# Patient Record
Sex: FEMALE | Race: BLACK OR AFRICAN AMERICAN | Employment: FULL TIME | URBAN - METROPOLITAN AREA
[De-identification: names, ages, dates, MRNs, and addresses within clinical notes are randomized per-mention and may not be internally consistent; named-entity substitution may affect disease eponyms.]

---

## 2018-08-22 LAB
CHLAMYDIA, EXTERNAL: NEGATIVE
HBSAG, EXTERNAL: NEGATIVE
HIV, EXTERNAL: NEGATIVE
N. GONORRHEA, EXTERNAL: NEGATIVE
RPR, EXTERNAL: NON REACTIVE
RUBELLA, EXTERNAL: NORMAL
TYPE, ABO & RH, EXTERNAL: NORMAL

## 2019-01-11 ENCOUNTER — HOSPITAL ENCOUNTER (OUTPATIENT)
Age: 34
Setting detail: OBSERVATION
Discharge: OTHER INSTITUTION | DRG: 833 | End: 2019-01-12
Attending: OBSTETRICS & GYNECOLOGY | Admitting: OBSTETRICS & GYNECOLOGY
Payer: COMMERCIAL

## 2019-01-11 LAB
ABO + RH BLD: NORMAL
ALBUMIN SERPL-MCNC: 2.5 G/DL (ref 3.4–5)
ALBUMIN/GLOB SERPL: 0.7 {RATIO} (ref 0.8–1.7)
ALP SERPL-CCNC: 68 U/L (ref 45–117)
ALT SERPL-CCNC: 24 U/L (ref 13–56)
ANION GAP SERPL CALC-SCNC: 6 MMOL/L (ref 3–18)
AST SERPL-CCNC: 27 U/L (ref 15–37)
BILIRUB SERPL-MCNC: 0.1 MG/DL (ref 0.2–1)
BLOOD GROUP ANTIBODIES SERPL: NORMAL
BUN SERPL-MCNC: 10 MG/DL (ref 7–18)
BUN/CREAT SERPL: 19 (ref 12–20)
CALCIUM SERPL-MCNC: 8.3 MG/DL (ref 8.5–10.1)
CHLORIDE SERPL-SCNC: 109 MMOL/L (ref 100–108)
CO2 SERPL-SCNC: 27 MMOL/L (ref 21–32)
CREAT SERPL-MCNC: 0.52 MG/DL (ref 0.6–1.3)
ERYTHROCYTE [DISTWIDTH] IN BLOOD BY AUTOMATED COUNT: 12.2 % (ref 11.6–14.5)
GLOBULIN SER CALC-MCNC: 3.6 G/DL (ref 2–4)
GLUCOSE SERPL-MCNC: 69 MG/DL (ref 74–99)
HCT VFR BLD AUTO: 31.9 % (ref 35–45)
HGB BLD-MCNC: 10.9 G/DL (ref 12–16)
LDH SERPL L TO P-CCNC: 160 U/L (ref 81–234)
MCH RBC QN AUTO: 30.3 PG (ref 24–34)
MCHC RBC AUTO-ENTMCNC: 34.2 G/DL (ref 31–37)
MCV RBC AUTO: 88.6 FL (ref 74–97)
PLATELET # BLD AUTO: 198 K/UL (ref 135–420)
PMV BLD AUTO: 8.8 FL (ref 9.2–11.8)
POTASSIUM SERPL-SCNC: 4.1 MMOL/L (ref 3.5–5.5)
PROT SERPL-MCNC: 6.1 G/DL (ref 6.4–8.2)
RBC # BLD AUTO: 3.6 M/UL (ref 4.2–5.3)
SODIUM SERPL-SCNC: 142 MMOL/L (ref 136–145)
SPECIMEN EXP DATE BLD: NORMAL
URATE SERPL-MCNC: 3.3 MG/DL (ref 2.6–7.2)
WBC # BLD AUTO: 5.3 K/UL (ref 4.6–13.2)

## 2019-01-11 PROCEDURE — 85027 COMPLETE CBC AUTOMATED: CPT

## 2019-01-11 PROCEDURE — 74011250637 HC RX REV CODE- 250/637: Performed by: OBSTETRICS & GYNECOLOGY

## 2019-01-11 PROCEDURE — 80053 COMPREHEN METABOLIC PANEL: CPT

## 2019-01-11 PROCEDURE — 86900 BLOOD TYPING SEROLOGIC ABO: CPT

## 2019-01-11 PROCEDURE — 36415 COLL VENOUS BLD VENIPUNCTURE: CPT

## 2019-01-11 PROCEDURE — 74011250636 HC RX REV CODE- 250/636: Performed by: OBSTETRICS & GYNECOLOGY

## 2019-01-11 PROCEDURE — 59025 FETAL NON-STRESS TEST: CPT

## 2019-01-11 PROCEDURE — 83615 LACTATE (LD) (LDH) ENZYME: CPT

## 2019-01-11 PROCEDURE — 84550 ASSAY OF BLOOD/URIC ACID: CPT

## 2019-01-11 RX ORDER — ASPIRIN 81 MG/1
81 TABLET ORAL DAILY
COMMUNITY

## 2019-01-11 RX ORDER — DEXTROSE MONOHYDRATE AND SODIUM CHLORIDE 5; .9 G/100ML; G/100ML
1000 INJECTION, SOLUTION INTRAVENOUS ONCE
Status: DISCONTINUED | OUTPATIENT
Start: 2019-01-11 | End: 2019-01-11

## 2019-01-11 RX ORDER — BETAMETHASONE SODIUM PHOSPHATE AND BETAMETHASONE ACETATE 3; 3 MG/ML; MG/ML
12 INJECTION, SUSPENSION INTRA-ARTICULAR; INTRALESIONAL; INTRAMUSCULAR; SOFT TISSUE EVERY 24 HOURS
Status: DISCONTINUED | OUTPATIENT
Start: 2019-01-11 | End: 2019-01-12 | Stop reason: HOSPADM

## 2019-01-11 RX ORDER — FOLIC ACID 1 MG/1
1 TABLET ORAL DAILY
COMMUNITY

## 2019-01-11 RX ORDER — LABETALOL 200 MG/1
200 TABLET, FILM COATED ORAL EVERY 12 HOURS
Status: DISCONTINUED | OUTPATIENT
Start: 2019-01-11 | End: 2019-01-12 | Stop reason: HOSPADM

## 2019-01-11 RX ORDER — LABETALOL 200 MG/1
200 TABLET, FILM COATED ORAL 2 TIMES DAILY
COMMUNITY

## 2019-01-11 RX ORDER — DEXTROSE, SODIUM CHLORIDE, SODIUM LACTATE, POTASSIUM CHLORIDE, AND CALCIUM CHLORIDE 5; .6; .31; .03; .02 G/100ML; G/100ML; G/100ML; G/100ML; G/100ML
1000 INJECTION, SOLUTION INTRAVENOUS CONTINUOUS
Status: DISCONTINUED | OUTPATIENT
Start: 2019-01-11 | End: 2019-01-12

## 2019-01-11 RX ORDER — NIFEDIPINE 30 MG/1
30 TABLET, EXTENDED RELEASE ORAL DAILY
COMMUNITY

## 2019-01-11 RX ADMIN — BETAMETHASONE ACETATE AND BETAMETHASONE SODIUM PHOSPHATE 12 MG: 3; 3 INJECTION, SUSPENSION INTRA-ARTICULAR; INTRALESIONAL; INTRAMUSCULAR; SOFT TISSUE at 23:08

## 2019-01-11 RX ADMIN — SODIUM CHLORIDE, SODIUM LACTATE, POTASSIUM CHLORIDE, CALCIUM CHLORIDE, AND DEXTROSE MONOHYDRATE 1000 ML/HR: 600; 310; 30; 20; 5 INJECTION, SOLUTION INTRAVENOUS at 17:57

## 2019-01-11 RX ADMIN — LABETALOL HCL 200 MG: 200 TABLET, FILM COATED ORAL at 21:31

## 2019-01-11 NOTE — PROGRESS NOTES
1225 Patient presents to unit from office for NST due to being unable to obtain a reactive tracing in the office. Patient is having NSTs due to 2 previous pregnancies with PIH/pre eclampsia. Patient taken to [address] [place] [address]. FHM and TOCO applied to patient abdomen. 1044 [address] Dr Janneth Hernandez on phone for update on patient. SBAR report given. Order received. Mets[place] 36 Dr Janneth Hernandez on phone for update on patient. MD made aware of decels in fetal heart rate. MD does not desire steroids at this point, but would like to observe patient overnight. 1930 Bedside and verbal SBAR report given to KIARA Mcguire RN. Care of patient relinquished at this time.

## 2019-01-12 VITALS
HEIGHT: 64 IN | WEIGHT: 165.4 LBS | BODY MASS INDEX: 28.24 KG/M2 | HEART RATE: 88 BPM | RESPIRATION RATE: 16 BRPM | TEMPERATURE: 98 F | SYSTOLIC BLOOD PRESSURE: 129 MMHG | OXYGEN SATURATION: 98 % | DIASTOLIC BLOOD PRESSURE: 91 MMHG

## 2019-01-12 PROBLEM — O16.9 HIGH BLOOD PRESSURE AFFECTING PREGNANCY, ANTEPARTUM: Status: ACTIVE | Noted: 2019-01-12

## 2019-01-12 PROBLEM — O28.8 NST (NON-STRESS TEST) NONREACTIVE: Status: ACTIVE | Noted: 2019-01-12

## 2019-01-12 PROBLEM — O09.299 HX OF PREECLAMPSIA, PRIOR PREGNANCY, CURRENTLY PREGNANT: Status: ACTIVE | Noted: 2019-01-12

## 2019-01-12 PROBLEM — Z33.1 IUP (INTRAUTERINE PREGNANCY), INCIDENTAL: Status: ACTIVE | Noted: 2019-01-12

## 2019-01-12 PROCEDURE — 74011250636 HC RX REV CODE- 250/636: Performed by: OBSTETRICS & GYNECOLOGY

## 2019-01-12 PROCEDURE — 65270000029 HC RM PRIVATE

## 2019-01-12 PROCEDURE — 77030010547 HC BG URIN W/UMETER -A

## 2019-01-12 PROCEDURE — 99218 HC RM OBSERVATION: CPT

## 2019-01-12 PROCEDURE — 77030034849

## 2019-01-12 PROCEDURE — 77030032490 HC SLV COMPR SCD KNE COVD -B

## 2019-01-12 RX ORDER — LEVETIRACETAM 5 MG/ML
500 INJECTION INTRAVASCULAR
Status: DISCONTINUED | OUTPATIENT
Start: 2019-01-12 | End: 2019-01-12 | Stop reason: HOSPADM

## 2019-01-12 RX ORDER — MAGNESIUM SULFATE HEPTAHYDRATE 40 MG/ML
4 INJECTION, SOLUTION INTRAVENOUS
Status: COMPLETED | OUTPATIENT
Start: 2019-01-12 | End: 2019-01-12

## 2019-01-12 RX ORDER — MAGNESIUM SULFATE HEPTAHYDRATE 40 MG/ML
2 INJECTION, SOLUTION INTRAVENOUS CONTINUOUS
Status: DISCONTINUED | OUTPATIENT
Start: 2019-01-12 | End: 2019-01-12 | Stop reason: HOSPADM

## 2019-01-12 RX ORDER — MAGNESIUM SULFATE HEPTAHYDRATE 40 MG/ML
4 INJECTION, SOLUTION INTRAVENOUS
Status: DISCONTINUED | OUTPATIENT
Start: 2019-01-12 | End: 2019-01-12

## 2019-01-12 RX ORDER — MAGNESIUM SULFATE HEPTAHYDRATE 40 MG/ML
2 INJECTION, SOLUTION INTRAVENOUS ONCE
Status: DISCONTINUED | OUTPATIENT
Start: 2019-01-12 | End: 2019-01-12 | Stop reason: SDUPTHER

## 2019-01-12 RX ORDER — DIAZEPAM 10 MG/2ML
5 INJECTION INTRAMUSCULAR
Status: DISCONTINUED | OUTPATIENT
Start: 2019-01-12 | End: 2019-01-12 | Stop reason: HOSPADM

## 2019-01-12 RX ORDER — MAGNESIUM SULFATE HEPTAHYDRATE 40 MG/ML
2 INJECTION, SOLUTION INTRAVENOUS
Status: DISCONTINUED | OUTPATIENT
Start: 2019-01-12 | End: 2019-01-12

## 2019-01-12 RX ORDER — LORAZEPAM 2 MG/ML
2 INJECTION INTRAMUSCULAR
Status: DISCONTINUED | OUTPATIENT
Start: 2019-01-12 | End: 2019-01-12 | Stop reason: HOSPADM

## 2019-01-12 RX ORDER — CALCIUM GLUCONATE 94 MG/ML
1 INJECTION, SOLUTION INTRAVENOUS AS NEEDED
Status: DISCONTINUED | OUTPATIENT
Start: 2019-01-12 | End: 2019-01-12 | Stop reason: HOSPADM

## 2019-01-12 RX ADMIN — MAGNESIUM SULFATE IN WATER 2 G/HR: 40 INJECTION, SOLUTION INTRAVENOUS at 01:41

## 2019-01-12 RX ADMIN — MAGNESIUM SULFATE HEPTAHYDRATE 4 G: 40 INJECTION, SOLUTION INTRAVENOUS at 01:22

## 2019-01-12 NOTE — H&P
Ostetrical History and Physical    Subjective:     Date of Admission: 2019    Patient is a 28 y.o.  female admitted with h/o toxemia pretermx2 leading to c/s. Started with increased bp in office 26 wks and with h/o chronic hypertension was started on labetolol . Pt seen in office today with nst with no accels and questions of a decel to 90bpm in office. Sent to l/d for evaluation and continued monitoring. For Obstetric history, see britany.    Past Medical History:   Diagnosis Date    Anemia     Anemia NEC     with pregnancy    Chlamydia     Years ago; no recent    Essential hypertension     pregnancy only    Gestational hypertension       Past Surgical History:   Procedure Laterality Date     DELIVERY ONLY        Prior to Admission medications    Medication Sig Start Date End Date Taking? Authorizing Provider   labetalol (NORMODYNE) 200 mg tablet Take 200 mg by mouth two (2) times a day. Yes Provider, Historical   NIFEdipine ER (PROCARDIA XL) 30 mg ER tablet Take 30 mg by mouth daily. Yes Provider, Historical   aspirin delayed-release 81 mg tablet Take 81 mg by mouth daily. Yes Provider, Historical   folic acid (FOLVITE) 1 mg tablet Take 1 mg by mouth daily. Yes Provider, Historical   multivitamin (ONE A DAY) tablet Take 1 Tab by mouth daily. Yes Provider, Historical   cholecalciferol, vitamin D3, (VITAMIN D3) 2,000 unit Tab Take  by mouth. Yes Provider, Historical   ferrous sulfate (IRON) 325 mg (65 mg iron) tablet Take  by mouth Daily (before breakfast). Yes Provider, Historical   oxyCODONE-acetaminophen (PERCOCET) 5-325 mg per tablet Take 1-2 Tabs by mouth every four (4) hours as needed. 14   Bryan Frederick MD   acetaminophen (TYLENOL EXTRA STRENGTH) 500 mg tablet Take 1,000 mg by mouth every six (6) hours as needed.     Indications: PAIN    Provider, Historical     Allergies   Allergen Reactions    Fruit & Vegetable Daily [Lycopene-Lutein-Fruit Extract] Hives and Itching     Fresh fruit only      Social History     Tobacco Use    Smoking status: Never Smoker    Smokeless tobacco: Never Used   Substance Use Topics    Alcohol use: No      Family History   Problem Relation Age of Onset    Hypertension Mother     Alcohol abuse Father     Hypertension Father     Arthritis-osteo Maternal Aunt     Elevated Lipids Maternal Aunt     Hypertension Maternal Aunt     Hypertension Maternal Uncle     Arthritis-osteo Maternal Grandmother     Hypertension Maternal Grandmother     Stroke Maternal Grandmother         Review of Systems    Objective:     Blood pressure 130/86, pulse 82, temperature 98.1 °F (36.7 °C), resp. rate 14, height 5' 4\" (1.626 m), weight 75 kg (165 lb 6.4 oz), SpO2 98 %, unknown if currently breastfeeding. Temp (24hrs), Av.1 °F (36.7 °C), Min:98.1 °F (36.7 °C), Max:98.1 °F (36.7 °C)        No intake/output data recorded. No intake/output data recorded.     @[email or web address]    Pelvic: nd  Data Review:   Recent Results (from the past 24 hour(s))   CBC W/O DIFF    Collection Time: 19  7:40 PM   Result Value Ref Range    WBC 5.3 4.6 - 13.2 K/uL    RBC 3.60 (L) 4.20 - 5.30 M/uL    HGB 10.9 (L) 12.0 - 16.0 g/dL    HCT 31.9 (L) 35.0 - 45.0 %    MCV 88.6 74.0 - 97.0 FL    MCH 30.3 24.0 - 34.0 PG    MCHC 34.2 31.0 - 37.0 g/dL    RDW 12.2 11.6 - 14.5 %    PLATELET 077 569 - 319 K/uL    MPV 8.8 (L) 9.2 - 03.8 FL   METABOLIC PANEL, COMPREHENSIVE    Collection Time: 19  7:40 PM   Result Value Ref Range    Sodium 142 136 - 145 mmol/L    Potassium 4.1 3.5 - 5.5 mmol/L    Chloride 109 (H) 100 - 108 mmol/L    CO2 27 21 - 32 mmol/L    Anion gap 6 3.0 - 18 mmol/L    Glucose 69 (L) 74 - 99 mg/dL    BUN 10 7.0 - 18 MG/DL    Creatinine 0.52 (L) 0.6 - 1.3 MG/DL    BUN/Creatinine ratio 19 12 - 20      GFR est AA >60 >60 ml/min/1.73m2    GFR est non-AA >60 >60 ml/min/1.73m2    Calcium 8.3 (L) 8.5 - 10.1 MG/DL    Bilirubin, total 0.1 (L) 0.2 - 1.0 MG/DL    ALT (SGPT) 24 13 - 56 U/L    AST (SGOT) 27 15 - 37 U/L    Alk. phosphatase 68 45 - 117 U/L    Protein, total 6.1 (L) 6.4 - 8.2 g/dL    Albumin 2.5 (L) 3.4 - 5.0 g/dL    Globulin 3.6 2.0 - 4.0 g/dL    A-G Ratio 0.7 (L) 0.8 - 1.7     LD    Collection Time: 01/11/19  7:40 PM   Result Value Ref Range     81 - 234 U/L   URIC ACID    Collection Time: 01/11/19  7:40 PM   Result Value Ref Range    Uric acid 3.3 2.6 - 7.2 MG/DL   TYPE & SCREEN    Collection Time: 01/11/19  7:40 PM   Result Value Ref Range    Crossmatch Expiration 01/14/2019     ABO/Rh(D) Rae Scarlet POSITIVE     Antibody screen NEG      Monitor:  Reactivity:not present Variability:present Baseline:normal with occasional spontaneous deceleration to 110-90 bpm for 30 sec. Fluid was normal today and baby is active. efw 3% 3 days ago with abnormal dopplers umb seen    Assessment:r/o severe toxemia and will give steroids     Active Problems:    * No active hospital problems.  *      Plan:magnesium sulfate for nueroprotection and consider delivery after steroids     Prophylaxis:  Deep Vein Thrombosis Protocol Active:Yes    Check labs:    Check  Prenatal:    Disposition    Total time spent with patient:23 Hour    Signed By: Gail Bernardo MD                         January 12, 2019

## 2019-01-12 NOTE — ROUTINE PROCESS
1930:  Bedside and Verbal shift change report given to Yue Cid RN   (oncoming nurse) by Pamela Wahl RN (offgoing nurse). Report included the following information SBAR, MAR and Recent Results. Alarm parameters reviewed and opportunities for questions provided. 2131:  Labetalol given (see MAR). 2230:  Telephone report given to Dr. Jeb Johnson of decelerations noted. Order received to administer celestone. 2308:  Celestone given (see MAR). 0000:  Dr. Jeb Johnson at bedside assessing patient and discussing plan of care. Dr. Jeb Johnson reviewed fetal heart rate tracing while patient on unit and reviewed patient's prenatal history, blood pressures while on unit, lab results. 36:  Order received to start Magnesium Sulfate and Dr. Jeb Johnson spoke to receiving physician at [address] [place] [address] and delivery and order received to transfer patient at that time. , receiving RN at [facility], transport service. Transport service ETA [id number].     0974:  Transport service on unit. 0211:  Patient left via stretcher with transport company to [facility].

## 2019-01-12 NOTE — PROGRESS NOTES
Discussed with vicki at [facility]. Agreed with magnesium sulfate for nueroprotection and steroids.  After discussion decision was made to transfer pt to high risk center [facility] for observation with possible severe toxemia with iugd and abnormal umb dopplers now with abnormal fetal heart tracing

## 2019-01-12 NOTE — PROGRESS NOTES
Discussed with pt iugd with abnormal dopplers and with elevated bp requiring medication this may be severe toxemia. Will start mag sulfate for nueroprotection and finish steroids.  Will consult with [facility]fm ,as well

## 2019-01-14 PROBLEM — Z3A.30 30 WEEKS GESTATION OF PREGNANCY: Status: ACTIVE | Noted: 2019-01-14

## 2019-01-14 NOTE — DISCHARGE SUMMARY
Discharge Summary    Admit date: 2019  Discharge date:  2019    Name: Geraldo Rosario   Medical Record Number: [id number]   YOB: 1985    Admission Diagnosis:   Patient Active Problem List    Diagnosis Date Noted    30 weeks gestation of pregnancy 2019     Priority: 1 - One    Hx of preeclampsia, prior pregnancy, currently pregnant 2019     Priority: 1 - One    NST (non-stress test) nonreactive 2019     Priority: 1 - One    IUP (intrauterine pregnancy), incidental 2019     Priority: 1 - One    High blood pressure affecting pregnancy, antepartum 2019     Priority: 1 - One    Gestational hypertension w/o significant proteinuria in 3rd trimester 2014     Priority: 1 - One    Toxemia in pregnancy 2012     Priority: 1 - One    Essential hypertension antepartum 2012     Priority: 1 - One    Pregnancy induced hypertension, antepartum 2012     Priority: 1 - One    Recurrent pregnancy loss without current pregnancy 2012     Priority: 1 - One    History of HELLP syndrome, currently pregnant 2014    S/P repeat low transverse  2014       Final Diagnosis:  Principal Problem:    Gestational hypertension w/o significant proteinuria in 3rd trimester (2014)    Active Problems:    Hx of preeclampsia, prior pregnancy, currently pregnant (2019)      NST (non-stress test) nonreactive (2019)      IUP (intrauterine pregnancy), incidental (2019)      High blood pressure affecting pregnancy, antepartum (2019)      30 weeks gestation of pregnancy (2019)        Significant admission findings ( see H&P): Patient is a 28 y.o.  female admitted with h/o toxemia pretermx2 leading to c/s. Started with increased bp in office 26 wks and with h/o chronic hypertension was started on labetolol .  Pt seen in office today with nst with no accels and questions of a decel to 90bpm in office. Sent to l/d for evaluation and continued monitoring. Hospital course: Patient was admitted, steroids started, monitoring, Mg SO4 started. Consult with [facility]. Transferred to [facility] for [facility] care and US eval.      Relevant Lab:     ABO/Rh(D)   Date Value Ref Range Status   01/11/2019 O POSITIVE  Final     ABO,Rh   Date Value Ref Range Status   08/22/2018 O Positive  Final     WBC   Date Value Ref Range Status   01/11/2019 5.3 4.6 - 13.2 K/uL Final   08/02/2014 4.7 4.6 - 13.2 K/uL Final     HGB   Date Value Ref Range Status   01/11/2019 10.9 (L) 12.0 - 16.0 g/dL Final   08/03/2014 10.8 (L) 12.0 - 16.0 g/dL Final     HCT   Date Value Ref Range Status   01/11/2019 31.9 (L) 35.0 - 45.0 % Final   08/03/2014 30.8 (L) 35.0 - 45.0 % Final     PLATELET   Date Value Ref Range Status   01/11/2019 198 135 - 420 K/uL Final   08/02/2014 181 135 - 420 K/uL Final     Hgb, External   Date Value Ref Range Status   06/03/2014 10.7  Final     Hct, External   Date Value Ref Range Status   06/03/2014 32.9  Final       Lab Results   Component Value Date/Time    Rubella, External Immune 5.62 08/22/2018    GrBStrep, External Postive  07/29/2014     Rhogam not given. Medication List      ASK your doctor about these medications    aspirin delayed-release 81 mg tablet     folic acid 1 mg tablet  Commonly known as:  FOLVITE     Iron 325 mg (65 mg iron) tablet  Generic drug:  ferrous sulfate     labetalol 200 mg tablet  Commonly known as:  NORMODYNE     multivitamin tablet  Commonly known as:  ONE A DAY     NIFEdipine ER 30 mg ER tablet  Commonly known as:  PROCARDIA XL     oxyCODONE-acetaminophen 5-325 mg per tablet  Commonly known as:  PERCOCET  Take 1-2 Tabs by mouth every four (4) hours as needed.      TYLENOL EXTRA STRENGTH 500 mg tablet  Generic drug:  acetaminophen     VITAMIN D3 2,000 unit Tab  Generic drug:  cholecalciferol (vitamin D3)             Signed: Candace Worthy MD      January 14, 2019

## 2022-03-18 PROBLEM — O28.8 NST (NON-STRESS TEST) NONREACTIVE: Status: ACTIVE | Noted: 2019-01-12

## 2022-03-18 PROBLEM — Z33.1 IUP (INTRAUTERINE PREGNANCY), INCIDENTAL: Status: ACTIVE | Noted: 2019-01-12

## 2022-03-19 PROBLEM — O16.9 HIGH BLOOD PRESSURE AFFECTING PREGNANCY, ANTEPARTUM: Status: ACTIVE | Noted: 2019-01-12

## 2022-03-19 PROBLEM — O09.299 HX OF PREECLAMPSIA, PRIOR PREGNANCY, CURRENTLY PREGNANT: Status: ACTIVE | Noted: 2019-01-12

## 2022-03-19 PROBLEM — Z3A.30 30 WEEKS GESTATION OF PREGNANCY: Status: ACTIVE | Noted: 2019-01-14

## 2023-03-30 ENCOUNTER — HOSPITAL ENCOUNTER (OUTPATIENT)
Facility: HOSPITAL | Age: 38
Discharge: HOME | End: 2023-03-30
Payer: COMMERCIAL

## 2023-03-30 LAB
ALBUMIN SERPL-MCNC: 4.1 G/DL (ref 3.4–5)
ALBUMIN/GLOB SERPL: 1 (ref 0.8–1.7)
ALP SERPL-CCNC: 38 U/L (ref 45–117)
ALT SERPL-CCNC: 31 U/L (ref 13–56)
ANION GAP SERPL CALC-SCNC: 2 MMOL/L (ref 3–18)
AST SERPL-CCNC: 20 U/L (ref 10–38)
BASOPHILS # BLD: 0 K/UL (ref 0–0.1)
BASOPHILS NFR BLD: 1 % (ref 0–2)
BILIRUB SERPL-MCNC: 0.4 MG/DL (ref 0.2–1)
BUN SERPL-MCNC: 12 MG/DL (ref 7–18)
BUN/CREAT SERPL: 16 (ref 12–20)
CALCIUM SERPL-MCNC: 9.3 MG/DL (ref 8.5–10.1)
CHLORIDE SERPL-SCNC: 107 MMOL/L (ref 100–111)
CO2 SERPL-SCNC: 30 MMOL/L (ref 21–32)
CREAT SERPL-MCNC: 0.75 MG/DL (ref 0.6–1.3)
DIFFERENTIAL METHOD BLD: ABNORMAL
EOSINOPHIL # BLD: 0.1 K/UL (ref 0–0.4)
EOSINOPHIL NFR BLD: 4 % (ref 0–5)
ERYTHROCYTE [DISTWIDTH] IN BLOOD BY AUTOMATED COUNT: 12 % (ref 11.6–14.5)
FERRITIN SERPL-MCNC: 12 NG/ML (ref 8–388)
FOLATE SERPL-MCNC: 10.4 NG/ML (ref 3.1–17.5)
GLOBULIN SER CALC-MCNC: 4 G/DL (ref 2–4)
GLUCOSE SERPL-MCNC: 82 MG/DL (ref 74–99)
HCT VFR BLD AUTO: 37.9 % (ref 35–45)
HGB BLD-MCNC: 12.4 G/DL (ref 12–16)
IMM GRANULOCYTES # BLD AUTO: 0 K/UL (ref 0–0.04)
IMM GRANULOCYTES NFR BLD AUTO: 0 % (ref 0–0.5)
LYMPHOCYTES # BLD: 1.5 K/UL (ref 0.9–3.6)
LYMPHOCYTES NFR BLD: 42 % (ref 21–52)
MCH RBC QN AUTO: 29.7 PG (ref 24–34)
MCHC RBC AUTO-ENTMCNC: 32.7 G/DL (ref 31–37)
MCV RBC AUTO: 90.9 FL (ref 78–100)
MONOCYTES # BLD: 0.4 K/UL (ref 0.05–1.2)
MONOCYTES NFR BLD: 11 % (ref 3–10)
NEUTS SEG # BLD: 1.5 K/UL (ref 1.8–8)
NEUTS SEG NFR BLD: 43 % (ref 40–73)
NRBC # BLD: 0 K/UL (ref 0–0.01)
NRBC BLD-RTO: 0 PER 100 WBC
PLATELET # BLD AUTO: 210 K/UL (ref 135–420)
PMV BLD AUTO: 9.3 FL (ref 9.2–11.8)
POTASSIUM SERPL-SCNC: 4.3 MMOL/L (ref 3.5–5.5)
PROT SERPL-MCNC: 8.1 G/DL (ref 6.4–8.2)
RBC # BLD AUTO: 4.17 M/UL (ref 4.2–5.3)
SODIUM SERPL-SCNC: 139 MMOL/L (ref 136–145)
TIBC SERPL-MCNC: 440 UG/DL (ref 250–450)
VIT B12 SERPL-MCNC: 364 PG/ML (ref 211–911)
WBC # BLD AUTO: 3.5 K/UL (ref 4.6–13.2)

## 2023-03-30 PROCEDURE — 82728 ASSAY OF FERRITIN: CPT

## 2023-03-30 PROCEDURE — 80053 COMPREHEN METABOLIC PANEL: CPT

## 2023-03-30 PROCEDURE — 82607 VITAMIN B-12: CPT

## 2023-03-30 PROCEDURE — 36415 COLL VENOUS BLD VENIPUNCTURE: CPT

## 2023-03-30 PROCEDURE — 85025 COMPLETE CBC W/AUTO DIFF WBC: CPT

## 2023-03-30 PROCEDURE — 83550 IRON BINDING TEST: CPT

## 2023-04-03 LAB
FAX TO NUMBER: NORMAL
TEST RESULTS FAXED TO: NORMAL

## 2023-07-10 ENCOUNTER — HOSPITAL ENCOUNTER (OUTPATIENT)
Facility: HOSPITAL | Age: 38
Discharge: HOME | End: 2023-07-13

## 2023-07-18 ENCOUNTER — HOSPITAL ENCOUNTER (OUTPATIENT)
Facility: HOSPITAL | Age: 38
Discharge: HOME | End: 2023-07-21

## 2023-07-18 LAB — LABCORP SPECIMEN COLLECTION: NORMAL

## 2023-10-19 ENCOUNTER — HOSPITAL ENCOUNTER (OUTPATIENT)
Facility: HOSPITAL | Age: 38
Discharge: HOME | End: 2023-10-19
Attending: OBSTETRICS & GYNECOLOGY
Payer: COMMERCIAL

## 2023-10-19 DIAGNOSIS — E04.9 NON-TOXIC GOITER: ICD-10-CM

## 2023-10-19 PROCEDURE — 76536 US EXAM OF HEAD AND NECK: CPT
